# Patient Record
Sex: MALE | Race: BLACK OR AFRICAN AMERICAN | NOT HISPANIC OR LATINO | Employment: FULL TIME | URBAN - METROPOLITAN AREA
[De-identification: names, ages, dates, MRNs, and addresses within clinical notes are randomized per-mention and may not be internally consistent; named-entity substitution may affect disease eponyms.]

---

## 2024-09-12 ENCOUNTER — APPOINTMENT (OUTPATIENT)
Dept: PRIMARY CARE | Facility: CLINIC | Age: 64
End: 2024-09-12
Payer: COMMERCIAL

## 2024-09-12 ENCOUNTER — OFFICE VISIT (OUTPATIENT)
Dept: PRIMARY CARE | Facility: CLINIC | Age: 64
End: 2024-09-12
Payer: COMMERCIAL

## 2024-09-12 VITALS
SYSTOLIC BLOOD PRESSURE: 159 MMHG | HEIGHT: 72 IN | BODY MASS INDEX: 27.22 KG/M2 | DIASTOLIC BLOOD PRESSURE: 99 MMHG | WEIGHT: 201 LBS | HEART RATE: 75 BPM

## 2024-09-12 DIAGNOSIS — Z00.00 ROUTINE GENERAL MEDICAL EXAMINATION AT A HEALTH CARE FACILITY: ICD-10-CM

## 2024-09-12 DIAGNOSIS — Z12.5 PROSTATE CANCER SCREENING: ICD-10-CM

## 2024-09-12 DIAGNOSIS — Z12.11 COLON CANCER SCREENING: Primary | ICD-10-CM

## 2024-09-12 DIAGNOSIS — Z86.19 H/O HEPATITIS: ICD-10-CM

## 2024-09-12 PROCEDURE — 3008F BODY MASS INDEX DOCD: CPT | Performed by: FAMILY MEDICINE

## 2024-09-12 PROCEDURE — 99386 PREV VISIT NEW AGE 40-64: CPT | Performed by: FAMILY MEDICINE

## 2024-09-12 ASSESSMENT — ENCOUNTER SYMPTOMS
DEPRESSION: 0
OCCASIONAL FEELINGS OF UNSTEADINESS: 0
LOSS OF SENSATION IN FEET: 0

## 2024-09-12 ASSESSMENT — PATIENT HEALTH QUESTIONNAIRE - PHQ9
2. FEELING DOWN, DEPRESSED OR HOPELESS: NOT AT ALL
1. LITTLE INTEREST OR PLEASURE IN DOING THINGS: NOT AT ALL
SUM OF ALL RESPONSES TO PHQ9 QUESTIONS 1 AND 2: 0

## 2024-09-12 NOTE — PROGRESS NOTES
Subjective   Patient ID: Yang Carvajal is a 64 y.o. male who presents for No chief complaint on file..    Last Physical : ___1_ Years ago     Pt's PMH, PSH, SH, FH , meds and allergies was obtained / reviewed and updated .     Dental  Visits : Y  Vision issues : N  Hearing issues : N    Immunizations : Y    Diet :  Could be better   Exercise:  Not regularly  Weight concerns : None    Alcohol: as noted in SH  Tobacco: as noted in SH  Recreational drugs :  None /as noted in SH     Sexually active : Active   Contraception :   Erectile dysfunction :    Colorectal cancer screening   Prostate cancer screening     Metabolic screening   - Lipids   - Glucose   New patient  H/o Hep C treated   Needs forms complteed for school  Refused BP medication. Aware that BP is high denies CP SOB    ==================================    Visit Vitals  BP (!) 159/99   Pulse 75   Ht 1.829 m (6')   Wt 91.2 kg (201 lb)   BMI 27.26 kg/m²   Smoking Status Never   BSA 2.15 m²      =====================  Review of Systems:    Constitutional: no chills, no fever and no night sweats.     Eyes: no blurred vision and no eyesight problems.     ENT: no hearing loss, no nasal congestion, no nasal discharge, no hoarseness and no sore throat.     Cardiovascular: no chest pain, no intermittent leg claudication, no lower extremity edema, no palpitations and no syncope.     Respiratory: no cough, no shortness of breath during exertion, no shortness of breath at rest and no wheezing.     Gastrointestinal: no abdominal pain, no constipation, no blood in stools, no diarrhea, no melena, no nausea, no rectal pain and no vomiting.     Genitourinary: no dysuria, no change in urinary frequency, no urinary hesitancy and no feelings of urinary urgency.     Musculoskeletal: no arthralgias, no back pain and no myalgias.     Integumentary: no new skin lesions and no rashes.     Neurological: no difficulty walking, no headache, no limb weakness, no numbness and no  tingling.     Psychiatric: no anxiety, no depression, no anhedonia and no substance use disorders.     Endocrine: no recent weight gain and no recent weight loss.     Hematologic/Lymphatic: no tendency for easy bruising and no swollen glands.          All other systems have been reviewed and are negative for complaint.    =====================================================    Physical exam :    Constitutional: Alert and in no acute distress. Well developed, well nourished.     Eyes: Normal external exam. Pupils were equal in size, round, reactive to light (PERRL) with normal accommodation and extraocular movements intact (EOMI).     Ears, Nose, Mouth, and Throat: External inspection of ears and nose: Normal.  Otoscopic examination: Normal.      Neck: No neck mass was observed. Supple.     Cardiovascular: Heart rate and rhythm were normal, normal S1 and S2, no gallops, no murmurs and no pericardial rub    Pulmonary: No respiratory distress. Clear bilateral breath sounds.     Abdomen: Soft nontender; no abdominal mass palpated. No organomegaly.     Musculoskeletal: No joint swelling seen, normal movements of all extremities. Range of motion: Normal.  Muscle strength/tone: Normal.      Skin: Normal skin color and pigmentation, normal skin turgor, and no rash.     Neurologic: Deep tendon reflexes were 2+ and symmetric. Sensation: Normal.     Psychiatric: Judgment and insight: Intact. Mood and affect: Normal.    Lymphatic : Cervical/ axillary/ groin Lns Palpable/ non palpable       Assessment/Plan    Problem List Items Addressed This Visit             ICD-10-CM    Routine general medical examination at a health care facility Z00.00    Relevant Orders    CBC    Comprehensive Metabolic Panel    Lipid Panel    TSH with reflex to Free T4 if abnormal    Hemoglobin A1c    Hepatitis C RNA, Quantitative, PCR    HIV 1/2 Antigen/Antibody Screen with Reflex to Confirmation    Prostate cancer screening Z12.5    Relevant Orders     Prostate Specific Antigen, Screen    Colon cancer screening - Primary Z12.11    Relevant Orders    Cologuard® colon cancer screening    H/O hepatitis Z86.19    Relevant Orders    Hepatitis C RNA, Quantitative, PCR    HIV 1/2 Antigen/Antibody Screen with Reflex to Confirmation

## 2024-09-15 PROBLEM — Z12.11 COLON CANCER SCREENING: Status: ACTIVE | Noted: 2024-09-15

## 2024-09-15 PROBLEM — Z00.00 ROUTINE GENERAL MEDICAL EXAMINATION AT A HEALTH CARE FACILITY: Status: ACTIVE | Noted: 2024-09-15

## 2024-09-15 PROBLEM — Z86.19 H/O HEPATITIS: Status: ACTIVE | Noted: 2024-09-15

## 2024-09-15 PROBLEM — Z12.5 PROSTATE CANCER SCREENING: Status: ACTIVE | Noted: 2024-09-15

## 2024-09-18 ENCOUNTER — LAB (OUTPATIENT)
Dept: LAB | Facility: LAB | Age: 64
End: 2024-09-18
Payer: COMMERCIAL

## 2024-09-18 DIAGNOSIS — Z00.00 ROUTINE GENERAL MEDICAL EXAMINATION AT A HEALTH CARE FACILITY: ICD-10-CM

## 2024-09-18 DIAGNOSIS — Z86.19 H/O HEPATITIS: ICD-10-CM

## 2024-09-18 DIAGNOSIS — Z12.5 PROSTATE CANCER SCREENING: ICD-10-CM

## 2024-09-18 LAB
ALBUMIN SERPL BCP-MCNC: 4.6 G/DL (ref 3.4–5)
ALP SERPL-CCNC: 62 U/L (ref 33–136)
ALT SERPL W P-5'-P-CCNC: 16 U/L (ref 10–52)
ANION GAP SERPL CALC-SCNC: 13 MMOL/L (ref 10–20)
AST SERPL W P-5'-P-CCNC: 18 U/L (ref 9–39)
BILIRUB SERPL-MCNC: 0.6 MG/DL (ref 0–1.2)
BUN SERPL-MCNC: 18 MG/DL (ref 6–23)
CALCIUM SERPL-MCNC: 9.8 MG/DL (ref 8.6–10.6)
CHLORIDE SERPL-SCNC: 103 MMOL/L (ref 98–107)
CHOLEST SERPL-MCNC: 166 MG/DL (ref 0–199)
CHOLESTEROL/HDL RATIO: 4.3
CO2 SERPL-SCNC: 27 MMOL/L (ref 21–32)
CREAT SERPL-MCNC: 1.25 MG/DL (ref 0.5–1.3)
EGFRCR SERPLBLD CKD-EPI 2021: 64 ML/MIN/1.73M*2
ERYTHROCYTE [DISTWIDTH] IN BLOOD BY AUTOMATED COUNT: 11.6 % (ref 11.5–14.5)
GLUCOSE SERPL-MCNC: 87 MG/DL (ref 74–99)
HCT VFR BLD AUTO: 38.6 % (ref 41–52)
HDLC SERPL-MCNC: 38.9 MG/DL
HGB BLD-MCNC: 12.9 G/DL (ref 13.5–17.5)
HIV 1+2 AB+HIV1 P24 AG SERPL QL IA: NONREACTIVE
LDLC SERPL CALC-MCNC: 110 MG/DL
MCH RBC QN AUTO: 29.1 PG (ref 26–34)
MCHC RBC AUTO-ENTMCNC: 33.4 G/DL (ref 32–36)
MCV RBC AUTO: 87 FL (ref 80–100)
NON HDL CHOLESTEROL: 127 MG/DL (ref 0–149)
NRBC BLD-RTO: 0 /100 WBCS (ref 0–0)
PLATELET # BLD AUTO: 220 X10*3/UL (ref 150–450)
POTASSIUM SERPL-SCNC: 4.4 MMOL/L (ref 3.5–5.3)
PROT SERPL-MCNC: 8.6 G/DL (ref 6.4–8.2)
PSA SERPL-MCNC: 2.84 NG/ML
RBC # BLD AUTO: 4.43 X10*6/UL (ref 4.5–5.9)
SODIUM SERPL-SCNC: 139 MMOL/L (ref 136–145)
TRIGL SERPL-MCNC: 87 MG/DL (ref 0–149)
TSH SERPL-ACNC: 0.86 MIU/L (ref 0.44–3.98)
VLDL: 17 MG/DL (ref 0–40)
WBC # BLD AUTO: 5.1 X10*3/UL (ref 4.4–11.3)

## 2024-09-18 PROCEDURE — 83550 IRON BINDING TEST: CPT

## 2024-09-18 PROCEDURE — 83540 ASSAY OF IRON: CPT

## 2024-09-18 PROCEDURE — 85027 COMPLETE CBC AUTOMATED: CPT

## 2024-09-18 PROCEDURE — 83036 HEMOGLOBIN GLYCOSYLATED A1C: CPT

## 2024-09-18 PROCEDURE — 84153 ASSAY OF PSA TOTAL: CPT

## 2024-09-18 PROCEDURE — 87389 HIV-1 AG W/HIV-1&-2 AB AG IA: CPT

## 2024-09-18 PROCEDURE — 84443 ASSAY THYROID STIM HORMONE: CPT

## 2024-09-18 PROCEDURE — 82607 VITAMIN B-12: CPT

## 2024-09-18 PROCEDURE — 36415 COLL VENOUS BLD VENIPUNCTURE: CPT

## 2024-09-18 PROCEDURE — 87522 HEPATITIS C REVRS TRNSCRPJ: CPT

## 2024-09-18 PROCEDURE — 80053 COMPREHEN METABOLIC PANEL: CPT

## 2024-09-18 PROCEDURE — 80061 LIPID PANEL: CPT

## 2024-09-19 LAB
EST. AVERAGE GLUCOSE BLD GHB EST-MCNC: 94 MG/DL
HBA1C MFR BLD: 4.9 %

## 2024-09-20 LAB
HCV RNA SERPL NAA+PROBE-ACNC: NOT DETECTED K[IU]/ML
HCV RNA SERPL NAA+PROBE-LOG IU: NORMAL {LOG_IU}/ML

## 2024-09-23 DIAGNOSIS — R71.8 MICROCYTOSIS: Primary | ICD-10-CM

## 2024-09-23 LAB
IRON SATN MFR SERPL: 37 % (ref 25–45)
IRON SERPL-MCNC: 135 UG/DL (ref 35–150)
TIBC SERPL-MCNC: 366 UG/DL (ref 240–445)
UIBC SERPL-MCNC: 231 UG/DL (ref 110–370)
VIT B12 SERPL-MCNC: 378 PG/ML (ref 211–911)

## 2024-10-05 LAB — NONINV COLON CA DNA+OCC BLD SCRN STL QL: NEGATIVE

## 2024-10-18 ENCOUNTER — LAB (OUTPATIENT)
Dept: LAB | Facility: LAB | Age: 64
End: 2024-10-18
Payer: COMMERCIAL

## 2024-10-18 DIAGNOSIS — R71.8 MICROCYTOSIS: ICD-10-CM

## 2024-10-18 LAB
ERYTHROCYTE [DISTWIDTH] IN BLOOD BY AUTOMATED COUNT: 11.8 % (ref 11.5–14.5)
HCT VFR BLD AUTO: 39.4 % (ref 41–52)
HGB BLD-MCNC: 12.7 G/DL (ref 13.5–17.5)
MCH RBC QN AUTO: 28.6 PG (ref 26–34)
MCHC RBC AUTO-ENTMCNC: 32.2 G/DL (ref 32–36)
MCV RBC AUTO: 89 FL (ref 80–100)
NRBC BLD-RTO: 0 /100 WBCS (ref 0–0)
PLATELET # BLD AUTO: 216 X10*3/UL (ref 150–450)
RBC # BLD AUTO: 4.44 X10*6/UL (ref 4.5–5.9)
WBC # BLD AUTO: 6.5 X10*3/UL (ref 4.4–11.3)

## 2024-10-18 PROCEDURE — 36415 COLL VENOUS BLD VENIPUNCTURE: CPT

## 2024-10-18 PROCEDURE — 85027 COMPLETE CBC AUTOMATED: CPT

## 2024-11-01 PROBLEM — J06.9 URI (UPPER RESPIRATORY INFECTION): Status: ACTIVE | Noted: 2024-11-01

## 2024-11-01 PROBLEM — R86.8 ASTHENOSPERMIA: Status: ACTIVE | Noted: 2024-11-01

## 2024-11-01 PROBLEM — R03.0 ELEVATED BLOOD PRESSURE READING: Status: RESOLVED | Noted: 2024-11-01 | Resolved: 2024-11-01

## 2024-11-01 PROBLEM — N46.9 MALE INFERTILITY: Status: ACTIVE | Noted: 2024-11-01

## 2024-11-01 PROBLEM — N40.1 BPH WITH OBSTRUCTION/LOWER URINARY TRACT SYMPTOMS: Status: ACTIVE | Noted: 2024-11-01

## 2024-11-01 PROBLEM — N13.8 BPH WITH OBSTRUCTION/LOWER URINARY TRACT SYMPTOMS: Status: ACTIVE | Noted: 2024-11-01

## 2024-11-01 RX ORDER — CLOMIPHENE CITRATE 50 MG/1
TABLET ORAL
COMMUNITY
Start: 2019-10-03

## 2024-11-01 RX ORDER — TADALAFIL 5 MG/1
1 TABLET ORAL DAILY
COMMUNITY
Start: 2018-04-24

## 2024-11-01 RX ORDER — HYDROCORTISONE 25 MG/G
CREAM TOPICAL
COMMUNITY

## 2024-11-01 RX ORDER — GUAIFENESIN 600 MG/1
600 TABLET, EXTENDED RELEASE ORAL 2 TIMES DAILY
COMMUNITY

## 2024-11-01 RX ORDER — FLUTICASONE PROPIONATE 50 MCG
SPRAY, SUSPENSION (ML) NASAL
COMMUNITY

## 2024-11-05 ENCOUNTER — APPOINTMENT (OUTPATIENT)
Dept: PRIMARY CARE | Facility: CLINIC | Age: 64
End: 2024-11-05
Payer: COMMERCIAL

## 2024-11-07 ENCOUNTER — APPOINTMENT (OUTPATIENT)
Dept: PRIMARY CARE | Facility: CLINIC | Age: 64
End: 2024-11-07
Payer: COMMERCIAL

## 2024-11-07 VITALS
DIASTOLIC BLOOD PRESSURE: 91 MMHG | HEIGHT: 72 IN | BODY MASS INDEX: 27.22 KG/M2 | WEIGHT: 201 LBS | HEART RATE: 69 BPM | SYSTOLIC BLOOD PRESSURE: 176 MMHG

## 2024-11-07 DIAGNOSIS — B19.20 HEPATITIS C VIRUS INFECTION WITHOUT HEPATIC COMA, UNSPECIFIED CHRONICITY: ICD-10-CM

## 2024-11-07 DIAGNOSIS — Z86.19 H/O HEPATITIS: ICD-10-CM

## 2024-11-07 DIAGNOSIS — R71.8 MICROCYTOSIS: ICD-10-CM

## 2024-11-07 DIAGNOSIS — I10 PRIMARY HYPERTENSION: Primary | ICD-10-CM

## 2024-11-07 PROCEDURE — 99214 OFFICE O/P EST MOD 30 MIN: CPT | Performed by: FAMILY MEDICINE

## 2024-11-07 PROCEDURE — 3077F SYST BP >= 140 MM HG: CPT | Performed by: FAMILY MEDICINE

## 2024-11-07 PROCEDURE — 3008F BODY MASS INDEX DOCD: CPT | Performed by: FAMILY MEDICINE

## 2024-11-07 PROCEDURE — 3080F DIAST BP >= 90 MM HG: CPT | Performed by: FAMILY MEDICINE

## 2024-11-07 RX ORDER — AMLODIPINE BESYLATE 5 MG/1
5 TABLET ORAL DAILY
Qty: 30 TABLET | Refills: 1 | Status: SHIPPED | OUTPATIENT
Start: 2024-11-07 | End: 2025-05-06

## 2024-11-07 ASSESSMENT — ENCOUNTER SYMPTOMS
OCCASIONAL FEELINGS OF UNSTEADINESS: 0
LOSS OF SENSATION IN FEET: 0
DEPRESSION: 0

## 2024-11-07 NOTE — PROGRESS NOTES
Subjective   Patient ID: Yang Carvajal is a 64 y.o. male who presents for Follow-up.      HPI  Patient continues to have high blood pressure has refused treatment in the past blood pressure today 170/90 denies chest pain shortness of breath headache dizziness was also anemic at the last labs anemia has been persistent   Recent Cologuard was negative history of hep C recent hep C was negative  Current Outpatient Medications on File Prior to Visit   Medication Sig Dispense Refill    clomiPHENE (Clomid) 50 mg tablet Take by mouth once daily.      fluticasone (Flonase) 50 mcg/actuation nasal spray SHAKE LIQUID AND USE 1 SPRAY IN EACH NOSTRIL DAILY      guaiFENesin (Mucinex) 600 mg 12 hr tablet Take 1 tablet (600 mg) by mouth 2 times a day.      hydrocortisone 2.5 % cream Hydrocortisone 2.5 % External Cream   Refills: 0       Active      tadalafil (Cialis) 5 mg tablet Take 1 tablet (5 mg) by mouth once daily.       No current facility-administered medications on file prior to visit.        Review of Systems   Constitutional:  Negative for chills and fever.   HENT: Negative.     Respiratory: Negative.     Cardiovascular: Negative.    Gastrointestinal: Negative.  Negative for nausea and vomiting.   Endocrine: Negative.    Genitourinary: Negative.    Musculoskeletal: Negative.    Skin: Negative.  Negative for rash.   Allergic/Immunologic: Negative.    Neurological: Negative.    Hematological: Negative.    Psychiatric/Behavioral: Negative.     All other systems reviewed and are negative.      Objective   BP (!) 176/91   Pulse 69   Ht 1.829 m (6')   Wt 91.2 kg (201 lb)   BMI 27.26 kg/m²   BSA: 2.15 meters squared  Growth percentiles: Facility age limit for growth %tino is 20 years. Facility age limit for growth %tino is 20 years.   No visits with results within 1 Week(s) from this visit.   Latest known visit with results is:   Lab on 10/18/2024   Component Date Value Ref Range Status    WBC 10/18/2024 6.5  4.4 - 11.3  x10*3/uL Final    nRBC 10/18/2024 0.0  0.0 - 0.0 /100 WBCs Final    RBC 10/18/2024 4.44 (L)  4.50 - 5.90 x10*6/uL Final    Hemoglobin 10/18/2024 12.7 (L)  13.5 - 17.5 g/dL Final    Hematocrit 10/18/2024 39.4 (L)  41.0 - 52.0 % Final    MCV 10/18/2024 89  80 - 100 fL Final    MCH 10/18/2024 28.6  26.0 - 34.0 pg Final    MCHC 10/18/2024 32.2  32.0 - 36.0 g/dL Final    RDW 10/18/2024 11.8  11.5 - 14.5 % Final    Platelets 10/18/2024 216  150 - 450 x10*3/uL Final      Physical Exam  Constitutional:       Appearance: Normal appearance.   Cardiovascular:      Rate and Rhythm: Normal rate and regular rhythm.   Pulmonary:      Effort: Pulmonary effort is normal.      Breath sounds: Normal breath sounds.   Abdominal:      General: Bowel sounds are normal.   Neurological:      General: No focal deficit present.      Mental Status: He is alert.   Psychiatric:         Mood and Affect: Mood normal.         Assessment/Plan   Problem List Items Addressed This Visit             ICD-10-CM    H/O hepatitis Z86.19     Recent hepatitis C is negative         RESOLVED: Hepatitis C virus infection B19.20     Hepatitis C was negative         Microcytosis R71.8     Longstanding history of anemia previous records were reviewed going back to 2021 which showed similar hemoglobin and hematocrit we will recheck in a month         Relevant Orders    CBC    Primary hypertension - Primary I10     What is High Blood Pressure? High blood pressure (also called hypertension) is when your blood moves through your arteries at a higher pressure than normal and that forces your heart to work harder to pump the blood.     What are the Complications of High Blood Pressure? When your blood pressure gets too high or stays high for a long time, it can cause health problems such as:    Kidney disease or kidney failure   Heart attack and heart failure   Stroke   Vision problems   Circulation problems, poor blood supply to the legs    How are the causes of High  Blood Pressure? There are many different causes and your provider can help you find out what might be causing your pressure to be too high.     What are the Signs of High Blood Pressure? High blood pressure doesn’t usually have warning signs or symptoms, so many people don’t realize they have it and that is why regular monitoring is important.     Prevention and Treatment: Often high blood pressure can be controlled with lifestyle changes and when necessary, medications. Adopting heart healthy habits can help:      Maintain a Healthy Weight   Eat a heart healthy diet full of vegetables, fruits and whole grains that are high in fiber   Be Physically Active every day   Find heart healthy ways to reduce stress and increase relaxation    Don’t use tobacco products   Limit your intake of alcohol, caffeine and salt   Monitor your blood pressure regularly: ask your provider how often   Take your medications as prescribed, even when you’re feeling well   Patient was advised that it is very important that he start medication will start amlodipine 5 mg daily patient will continue to monitor blood pressure at home and call this office back he was advised to stop in 2 weeks for nurse visit to get blood pressure checked follow-up with me in a month to check blood pressure         Relevant Medications    amLODIPine (Norvasc) 5 mg tablet

## 2024-11-10 PROBLEM — I10 PRIMARY HYPERTENSION: Status: ACTIVE | Noted: 2024-11-10

## 2024-11-10 PROBLEM — R71.8 MICROCYTOSIS: Status: ACTIVE | Noted: 2024-11-10

## 2024-11-10 ASSESSMENT — ENCOUNTER SYMPTOMS
ENDOCRINE NEGATIVE: 1
ALLERGIC/IMMUNOLOGIC NEGATIVE: 1
VOMITING: 0
PSYCHIATRIC NEGATIVE: 1
GASTROINTESTINAL NEGATIVE: 1
MUSCULOSKELETAL NEGATIVE: 1
CARDIOVASCULAR NEGATIVE: 1
FEVER: 0
CHILLS: 0
NAUSEA: 0
RESPIRATORY NEGATIVE: 1
NEUROLOGICAL NEGATIVE: 1
HEMATOLOGIC/LYMPHATIC NEGATIVE: 1

## 2024-11-10 NOTE — ASSESSMENT & PLAN NOTE
Longstanding history of anemia previous records were reviewed going back to 2021 which showed similar hemoglobin and hematocrit we will recheck in a month

## 2024-11-10 NOTE — ASSESSMENT & PLAN NOTE
What is High Blood Pressure? High blood pressure (also called hypertension) is when your blood moves through your arteries at a higher pressure than normal and that forces your heart to work harder to pump the blood.     What are the Complications of High Blood Pressure? When your blood pressure gets too high or stays high for a long time, it can cause health problems such as:    Kidney disease or kidney failure   Heart attack and heart failure   Stroke   Vision problems   Circulation problems, poor blood supply to the legs    How are the causes of High Blood Pressure? There are many different causes and your provider can help you find out what might be causing your pressure to be too high.     What are the Signs of High Blood Pressure? High blood pressure doesn’t usually have warning signs or symptoms, so many people don’t realize they have it and that is why regular monitoring is important.     Prevention and Treatment: Often high blood pressure can be controlled with lifestyle changes and when necessary, medications. Adopting heart healthy habits can help:      Maintain a Healthy Weight   Eat a heart healthy diet full of vegetables, fruits and whole grains that are high in fiber   Be Physically Active every day   Find heart healthy ways to reduce stress and increase relaxation    Don’t use tobacco products   Limit your intake of alcohol, caffeine and salt   Monitor your blood pressure regularly: ask your provider how often   Take your medications as prescribed, even when you’re feeling well   Patient was advised that it is very important that he start medication will start amlodipine 5 mg daily patient will continue to monitor blood pressure at home and call this office back he was advised to stop in 2 weeks for nurse visit to get blood pressure checked follow-up with me in a month to check blood pressure

## 2024-12-23 ENCOUNTER — APPOINTMENT (OUTPATIENT)
Dept: PRIMARY CARE | Facility: CLINIC | Age: 64
End: 2024-12-23
Payer: COMMERCIAL

## 2024-12-23 VITALS
SYSTOLIC BLOOD PRESSURE: 146 MMHG | WEIGHT: 202 LBS | BODY MASS INDEX: 27.36 KG/M2 | HEIGHT: 72 IN | HEART RATE: 73 BPM | DIASTOLIC BLOOD PRESSURE: 89 MMHG

## 2024-12-23 DIAGNOSIS — M54.12 LEFT CERVICAL RADICULOPATHY: Primary | ICD-10-CM

## 2024-12-23 DIAGNOSIS — N13.8 BPH WITH OBSTRUCTION/LOWER URINARY TRACT SYMPTOMS: ICD-10-CM

## 2024-12-23 DIAGNOSIS — N40.1 BPH WITH OBSTRUCTION/LOWER URINARY TRACT SYMPTOMS: ICD-10-CM

## 2024-12-23 DIAGNOSIS — R71.8 MICROCYTOSIS: ICD-10-CM

## 2024-12-23 DIAGNOSIS — I10 PRIMARY HYPERTENSION: ICD-10-CM

## 2024-12-23 PROCEDURE — 3077F SYST BP >= 140 MM HG: CPT | Performed by: FAMILY MEDICINE

## 2024-12-23 PROCEDURE — 99214 OFFICE O/P EST MOD 30 MIN: CPT | Performed by: FAMILY MEDICINE

## 2024-12-23 PROCEDURE — 3079F DIAST BP 80-89 MM HG: CPT | Performed by: FAMILY MEDICINE

## 2024-12-23 PROCEDURE — 3008F BODY MASS INDEX DOCD: CPT | Performed by: FAMILY MEDICINE

## 2024-12-23 RX ORDER — AMLODIPINE BESYLATE 5 MG/1
5 TABLET ORAL DAILY
Qty: 90 TABLET | Refills: 1 | Status: SHIPPED | OUTPATIENT
Start: 2024-12-23 | End: 2025-06-21

## 2024-12-23 ASSESSMENT — ENCOUNTER SYMPTOMS
DEPRESSION: 0
LOSS OF SENSATION IN FEET: 0
OCCASIONAL FEELINGS OF UNSTEADINESS: 0

## 2024-12-23 ASSESSMENT — PATIENT HEALTH QUESTIONNAIRE - PHQ9
1. LITTLE INTEREST OR PLEASURE IN DOING THINGS: NOT AT ALL
SUM OF ALL RESPONSES TO PHQ9 QUESTIONS 1 AND 2: 0
2. FEELING DOWN, DEPRESSED OR HOPELESS: NOT AT ALL

## 2024-12-23 NOTE — PROGRESS NOTES
Subjective   Patient ID: Yang Carvajal is a 64 y.o. male who presents for Follow-up.      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  Taking iron  Current Outpatient Medications on File Prior to Visit   Medication Sig Dispense Refill    clomiPHENE (Clomid) 50 mg tablet Take by mouth once daily.      fluticasone (Flonase) 50 mcg/actuation nasal spray SHAKE LIQUID AND USE 1 SPRAY IN EACH NOSTRIL DAILY      guaiFENesin (Mucinex) 600 mg 12 hr tablet Take 1 tablet (600 mg) by mouth 2 times a day.      hydrocortisone 2.5 % cream Hydrocortisone 2.5 % External Cream   Refills: 0       Active      tadalafil (Cialis) 5 mg tablet Take 1 tablet (5 mg) by mouth once daily.      [DISCONTINUED] amLODIPine (Norvasc) 5 mg tablet Take 1 tablet (5 mg) by mouth once daily. 30 tablet 1     No current facility-administered medications on file prior to visit.        Review of Systems   Constitutional:  Negative for chills and fever.   HENT: Negative.     Respiratory: Negative.     Cardiovascular: Negative.    Gastrointestinal: Negative.  Negative for nausea and vomiting.   Endocrine: Negative.    Genitourinary: Negative.    Musculoskeletal: Negative.    Skin: Negative.  Negative for rash.   Allergic/Immunologic: Negative.    Neurological:  Positive for numbness.   Hematological: Negative.    Psychiatric/Behavioral: Negative.     All other systems reviewed and are negative.      Objective   /89   Pulse 73   Ht 1.829 m (6')   Wt 91.6 kg (202 lb)   BMI 27.40 kg/m²   BSA: 2.16 meters squared  Growth percentiles: Facility age limit for growth %tino is 20 years. Facility age limit for growth %tino is 20 years.   No visits with results within 1 Week(s) from this visit.   Latest known visit with results is:   Lab on 10/18/2024   Component Date Value Ref Range Status    WBC 10/18/2024  6.5  4.4 - 11.3 x10*3/uL Final    nRBC 10/18/2024 0.0  0.0 - 0.0 /100 WBCs Final    RBC 10/18/2024 4.44 (L)  4.50 - 5.90 x10*6/uL Final    Hemoglobin 10/18/2024 12.7 (L)  13.5 - 17.5 g/dL Final    Hematocrit 10/18/2024 39.4 (L)  41.0 - 52.0 % Final    MCV 10/18/2024 89  80 - 100 fL Final    MCH 10/18/2024 28.6  26.0 - 34.0 pg Final    MCHC 10/18/2024 32.2  32.0 - 36.0 g/dL Final    RDW 10/18/2024 11.8  11.5 - 14.5 % Final    Platelets 10/18/2024 216  150 - 450 x10*3/uL Final      Physical Exam  Constitutional:       General: He is not in acute distress.  Eyes:      Extraocular Movements: Extraocular movements intact.   Cardiovascular:      Rate and Rhythm: Normal rate and regular rhythm.   Pulmonary:      Breath sounds: Normal breath sounds.   Abdominal:      General: Bowel sounds are normal.   Musculoskeletal:         General: Normal range of motion.      Cervical back: No rigidity.   Skin:     Findings: No rash.   Neurological:      General: No focal deficit present.      Mental Status: He is alert.   Psychiatric:         Thought Content: Thought content normal.         Assessment/Plan   Problem List Items Addressed This Visit       BPH with obstruction/lower urinary tract symptoms    Microcytosis     Longstanding history of anemia previous records were reviewed going back to 2021 which showed similar hemoglobin and hematocrit we will recheck in a month         Primary hypertension     What is High Blood Pressure? High blood pressure (also called hypertension) is when your blood moves through your arteries at a higher pressure than normal and that forces your heart to work harder to pump the blood.     What are the Complications of High Blood Pressure? When your blood pressure gets too high or stays high for a long time, it can cause health problems such as:    Kidney disease or kidney failure   Heart attack and heart failure   Stroke   Vision problems   Circulation problems, poor blood supply to the legs    How  are the causes of High Blood Pressure? There are many different causes and your provider can help you find out what might be causing your pressure to be too high.     What are the Signs of High Blood Pressure? High blood pressure doesn’t usually have warning signs or symptoms, so many people don’t realize they have it and that is why regular monitoring is important.     Prevention and Treatment: Often high blood pressure can be controlled with lifestyle changes and when necessary, medications. Adopting heart healthy habits can help:      Maintain a Healthy Weight   Eat a heart healthy diet full of vegetables, fruits and whole grains that are high in fiber   Be Physically Active every day   Find heart healthy ways to reduce stress and increase relaxation    Don’t use tobacco products   Limit your intake of alcohol, caffeine and salt   Monitor your blood pressure regularly: ask your provider how often   Take your medications as prescribed, even when you’re feeling well   Patient was advised that it is very important that he start medication will start amlodipine 5 mg daily patient will continue to monitor blood pressure at home and call this office back he was advised to stop in 2 weeks for nurse visit to get blood pressure checked follow-up with me in a month to check blood pressure         Relevant Medications    amLODIPine (Norvasc) 5 mg tablet    Left cervical radiculopathy - Primary    Relevant Orders    XR cervical spine 2-3 views

## 2024-12-27 ENCOUNTER — HOSPITAL ENCOUNTER (OUTPATIENT)
Dept: RADIOLOGY | Facility: CLINIC | Age: 64
Discharge: HOME | End: 2024-12-27
Payer: COMMERCIAL

## 2024-12-27 DIAGNOSIS — M54.12 LEFT CERVICAL RADICULOPATHY: ICD-10-CM

## 2024-12-27 PROCEDURE — 72040 X-RAY EXAM NECK SPINE 2-3 VW: CPT

## 2024-12-29 PROBLEM — M54.12 LEFT CERVICAL RADICULOPATHY: Status: ACTIVE | Noted: 2024-12-29

## 2024-12-29 ASSESSMENT — ENCOUNTER SYMPTOMS
ALLERGIC/IMMUNOLOGIC NEGATIVE: 1
CARDIOVASCULAR NEGATIVE: 1
RESPIRATORY NEGATIVE: 1
HEMATOLOGIC/LYMPHATIC NEGATIVE: 1
PSYCHIATRIC NEGATIVE: 1
MUSCULOSKELETAL NEGATIVE: 1
FEVER: 0
CHILLS: 0
NUMBNESS: 1
NAUSEA: 0
ENDOCRINE NEGATIVE: 1
VOMITING: 0
GASTROINTESTINAL NEGATIVE: 1

## 2025-01-03 DIAGNOSIS — R20.0 NUMBNESS OF UPPER EXTREMITY: ICD-10-CM

## 2025-01-03 DIAGNOSIS — M54.12 LEFT CERVICAL RADICULOPATHY: ICD-10-CM

## 2025-01-03 DIAGNOSIS — M50.30 DDD (DEGENERATIVE DISC DISEASE), CERVICAL: Primary | ICD-10-CM

## 2025-01-29 ENCOUNTER — HOSPITAL ENCOUNTER (OUTPATIENT)
Dept: RADIOLOGY | Facility: CLINIC | Age: 65
Discharge: HOME | End: 2025-01-29
Payer: COMMERCIAL

## 2025-01-29 DIAGNOSIS — M50.30 DDD (DEGENERATIVE DISC DISEASE), CERVICAL: ICD-10-CM

## 2025-01-29 DIAGNOSIS — R20.0 NUMBNESS OF UPPER EXTREMITY: ICD-10-CM

## 2025-01-29 DIAGNOSIS — M54.12 LEFT CERVICAL RADICULOPATHY: ICD-10-CM

## 2025-01-29 PROCEDURE — 72141 MRI NECK SPINE W/O DYE: CPT

## 2025-01-29 PROCEDURE — 72141 MRI NECK SPINE W/O DYE: CPT | Performed by: RADIOLOGY

## 2025-01-31 DIAGNOSIS — D50.9 MICROCYTIC ANEMIA: Primary | ICD-10-CM

## 2025-03-25 ENCOUNTER — LAB (OUTPATIENT)
Dept: LAB | Facility: HOSPITAL | Age: 65
End: 2025-03-25
Payer: COMMERCIAL

## 2025-03-25 ENCOUNTER — OFFICE VISIT (OUTPATIENT)
Dept: HEMATOLOGY/ONCOLOGY | Facility: HOSPITAL | Age: 65
End: 2025-03-25
Payer: COMMERCIAL

## 2025-03-25 VITALS
TEMPERATURE: 97.2 F | BODY MASS INDEX: 28.94 KG/M2 | WEIGHT: 202.16 LBS | RESPIRATION RATE: 20 BRPM | DIASTOLIC BLOOD PRESSURE: 97 MMHG | OXYGEN SATURATION: 100 % | HEART RATE: 72 BPM | SYSTOLIC BLOOD PRESSURE: 158 MMHG | HEIGHT: 70 IN

## 2025-03-25 DIAGNOSIS — R71.8 MICROCYTOSIS: ICD-10-CM

## 2025-03-25 DIAGNOSIS — D64.9 ANEMIA, UNSPECIFIED TYPE: Primary | ICD-10-CM

## 2025-03-25 DIAGNOSIS — D64.9 ANEMIA, UNSPECIFIED TYPE: ICD-10-CM

## 2025-03-25 LAB
ALBUMIN SERPL BCP-MCNC: 4.8 G/DL (ref 3.4–5)
ALP SERPL-CCNC: 64 U/L (ref 33–136)
ALT SERPL W P-5'-P-CCNC: 18 U/L (ref 10–52)
ANION GAP SERPL CALC-SCNC: 11 MMOL/L (ref 10–20)
AST SERPL W P-5'-P-CCNC: 19 U/L (ref 9–39)
BASOPHILS # BLD AUTO: 0.05 X10*3/UL (ref 0–0.1)
BASOPHILS NFR BLD AUTO: 1 %
BILIRUB SERPL-MCNC: 0.3 MG/DL (ref 0–1.2)
BUN SERPL-MCNC: 19 MG/DL (ref 6–23)
CALCIUM SERPL-MCNC: 10 MG/DL (ref 8.6–10.3)
CHLORIDE SERPL-SCNC: 103 MMOL/L (ref 98–107)
CO2 SERPL-SCNC: 31 MMOL/L (ref 21–32)
CREAT SERPL-MCNC: 1.3 MG/DL (ref 0.5–1.3)
CRP SERPL-MCNC: 0.13 MG/DL
DAT-POLYSPECIFIC: NORMAL
EGFRCR SERPLBLD CKD-EPI 2021: 61 ML/MIN/1.73M*2
EOSINOPHIL # BLD AUTO: 0.27 X10*3/UL (ref 0–0.7)
EOSINOPHIL NFR BLD AUTO: 5.3 %
ERYTHROCYTE [DISTWIDTH] IN BLOOD BY AUTOMATED COUNT: 11.6 % (ref 11.5–14.5)
ERYTHROCYTE [SEDIMENTATION RATE] IN BLOOD BY WESTERGREN METHOD: 4 MM/H (ref 0–20)
FERRITIN SERPL-MCNC: 245 NG/ML (ref 20–300)
FOLATE SERPL-MCNC: 9.8 NG/ML
GLUCOSE SERPL-MCNC: 112 MG/DL (ref 74–99)
HAPTOGLOB SERPL NEPH-MCNC: 94 MG/DL (ref 30–200)
HCT VFR BLD AUTO: 40.6 % (ref 41–52)
HGB BLD-MCNC: 13.2 G/DL (ref 13.5–17.5)
HGB RETIC QN: 33 PG (ref 28–38)
IGA SERPL-MCNC: 180 MG/DL (ref 70–400)
IGG SERPL-MCNC: 2640 MG/DL (ref 700–1600)
IGM SERPL-MCNC: 99 MG/DL (ref 40–230)
IMM GRANULOCYTES # BLD AUTO: 0.01 X10*3/UL (ref 0–0.7)
IMM GRANULOCYTES NFR BLD AUTO: 0.2 % (ref 0–0.9)
IMMATURE RETIC FRACTION: 9.2 %
IRON SATN MFR SERPL: 18 % (ref 25–45)
IRON SERPL-MCNC: 72 UG/DL (ref 35–150)
KAPPA LC SERPL-MCNC: 4.57 MG/DL (ref 0.33–1.94)
KAPPA LC/LAMBDA SER: 1.62 {RATIO} (ref 0.26–1.65)
LAMBDA LC SERPL-MCNC: 2.82 MG/DL (ref 0.57–2.63)
LDH SERPL L TO P-CCNC: 144 U/L (ref 84–246)
LYMPHOCYTES # BLD AUTO: 1.78 X10*3/UL (ref 1.2–4.8)
LYMPHOCYTES NFR BLD AUTO: 35.2 %
MCH RBC QN AUTO: 28.9 PG (ref 26–34)
MCHC RBC AUTO-ENTMCNC: 32.5 G/DL (ref 32–36)
MCV RBC AUTO: 89 FL (ref 80–100)
MONOCYTES # BLD AUTO: 0.44 X10*3/UL (ref 0.1–1)
MONOCYTES NFR BLD AUTO: 8.7 %
NEUTROPHILS # BLD AUTO: 2.51 X10*3/UL (ref 1.2–7.7)
NEUTROPHILS NFR BLD AUTO: 49.6 %
NRBC BLD-RTO: 0 /100 WBCS (ref 0–0)
PLATELET # BLD AUTO: 206 X10*3/UL (ref 150–450)
POTASSIUM SERPL-SCNC: 4.2 MMOL/L (ref 3.5–5.3)
PROT SERPL-MCNC: 8.7 G/DL (ref 6.4–8.2)
PROT SERPL-MCNC: 8.9 G/DL (ref 6.4–8.2)
RBC # BLD AUTO: 4.56 X10*6/UL (ref 4.5–5.9)
RETICS #: 0.07 X10*6/UL (ref 0.02–0.12)
RETICS/RBC NFR AUTO: 1.6 % (ref 0.5–2)
RHEUMATOID FACT SER NEPH-ACNC: <10 IU/ML (ref 0–15)
SODIUM SERPL-SCNC: 141 MMOL/L (ref 136–145)
TIBC SERPL-MCNC: 390 UG/DL (ref 240–445)
TSH SERPL-ACNC: 1.01 MIU/L (ref 0.44–3.98)
UIBC SERPL-MCNC: 318 UG/DL (ref 110–370)
VIT B12 SERPL-MCNC: 304 PG/ML (ref 211–911)
WBC # BLD AUTO: 5.1 X10*3/UL (ref 4.4–11.3)

## 2025-03-25 PROCEDURE — 3077F SYST BP >= 140 MM HG: CPT | Performed by: PHYSICIAN ASSISTANT

## 2025-03-25 PROCEDURE — 83010 ASSAY OF HAPTOGLOBIN QUANT: CPT

## 2025-03-25 PROCEDURE — 86334 IMMUNOFIX E-PHORESIS SERUM: CPT

## 2025-03-25 PROCEDURE — 80053 COMPREHEN METABOLIC PANEL: CPT

## 2025-03-25 PROCEDURE — 86140 C-REACTIVE PROTEIN: CPT

## 2025-03-25 PROCEDURE — 86431 RHEUMATOID FACTOR QUANT: CPT

## 2025-03-25 PROCEDURE — 1160F RVW MEDS BY RX/DR IN RCRD: CPT | Performed by: PHYSICIAN ASSISTANT

## 2025-03-25 PROCEDURE — 86880 COOMBS TEST DIRECT: CPT

## 2025-03-25 PROCEDURE — 3008F BODY MASS INDEX DOCD: CPT | Performed by: PHYSICIAN ASSISTANT

## 2025-03-25 PROCEDURE — 36415 COLL VENOUS BLD VENIPUNCTURE: CPT

## 2025-03-25 PROCEDURE — 85025 COMPLETE CBC W/AUTO DIFF WBC: CPT

## 2025-03-25 PROCEDURE — 82746 ASSAY OF FOLIC ACID SERUM: CPT

## 2025-03-25 PROCEDURE — 82784 ASSAY IGA/IGD/IGG/IGM EACH: CPT

## 2025-03-25 PROCEDURE — 86038 ANTINUCLEAR ANTIBODIES: CPT

## 2025-03-25 PROCEDURE — 1126F AMNT PAIN NOTED NONE PRSNT: CPT | Performed by: PHYSICIAN ASSISTANT

## 2025-03-25 PROCEDURE — 82668 ASSAY OF ERYTHROPOIETIN: CPT

## 2025-03-25 PROCEDURE — 99204 OFFICE O/P NEW MOD 45 MIN: CPT | Performed by: PHYSICIAN ASSISTANT

## 2025-03-25 PROCEDURE — 85652 RBC SED RATE AUTOMATED: CPT

## 2025-03-25 PROCEDURE — 3080F DIAST BP >= 90 MM HG: CPT | Performed by: PHYSICIAN ASSISTANT

## 2025-03-25 PROCEDURE — 82607 VITAMIN B-12: CPT

## 2025-03-25 PROCEDURE — 82728 ASSAY OF FERRITIN: CPT

## 2025-03-25 PROCEDURE — 99214 OFFICE O/P EST MOD 30 MIN: CPT | Mod: 25 | Performed by: PHYSICIAN ASSISTANT

## 2025-03-25 PROCEDURE — 86225 DNA ANTIBODY NATIVE: CPT

## 2025-03-25 PROCEDURE — 83615 LACTATE (LD) (LDH) ENZYME: CPT

## 2025-03-25 PROCEDURE — 83521 IG LIGHT CHAINS FREE EACH: CPT

## 2025-03-25 PROCEDURE — 1159F MED LIST DOCD IN RCRD: CPT | Performed by: PHYSICIAN ASSISTANT

## 2025-03-25 PROCEDURE — 83540 ASSAY OF IRON: CPT

## 2025-03-25 PROCEDURE — 84443 ASSAY THYROID STIM HORMONE: CPT

## 2025-03-25 PROCEDURE — 84165 PROTEIN E-PHORESIS SERUM: CPT

## 2025-03-25 PROCEDURE — 84155 ASSAY OF PROTEIN SERUM: CPT | Mod: 59

## 2025-03-25 PROCEDURE — 85045 AUTOMATED RETICULOCYTE COUNT: CPT

## 2025-03-25 ASSESSMENT — PAIN SCALES - GENERAL: PAINLEVEL_OUTOF10: 0-NO PAIN

## 2025-03-25 NOTE — PROGRESS NOTES
Patient ID: Yang Carvajal is a 65 y.o. male.  Referring Physician: Domo Shields MD  [address] [facility] [address]  [place] [address]  Primary Care Provider: Domo Shields MD  Visit Type: Initial Visit    Location: [facility] - Main  Diagnosis/Reason: Anemia    HPI:  Yang Carvajal is a 65 y.o. male referred for consultation of anemia    Per review of records:  NC/NC anemia since at least 9/18/24 with Hb range of 12.7-12.9 since then  RBC counts & Hct's low since 9/18/24 - RDW's WNL historically    Previous Hematological Background:  Hx of hematological disorders: No - Patient denies prior hematologic history  Hx of blood transfusions: No - Patient denies prior blood transfusion  Hx of iron supplementation: Yes - Oral supplementation - Denies adverse effect and reaction - Agent: OTC iron  Hx of B12 supplementation: Yes - Prior oral supplementation - Denies adverse effect and reaction  Hx of folate supplementation: Yes - Prior oral supplementation - Denies adverse effect and reaction    Patient denies weight loss, abnormal bruising and bleeding, hematuria, blood in stool, dark/black stools, epistaxis, oral/gingival bleeding, lymphadenopathy, recurrent infections, recurrent fevers, night sweats, early satiety, abdominal pain, bone pain, chest pain, palpitations, SOB, MITCHELL, fatigue, dizziness, lightheadedness, PICA.    PMHx:  Active Ambulatory Problems     Diagnosis Date Noted    Routine general medical examination at a health care facility 09/15/2024    Prostate cancer screening 09/15/2024    Colon cancer screening 09/15/2024    H/O hepatitis 09/15/2024    Asthenospermia 11/01/2024    BPH with obstruction/lower urinary tract symptoms 11/01/2024    Male infertility 11/01/2024    Orchitis and epididymitis 07/08/2005    URI (upper respiratory infection) 11/01/2024    Microcytosis 11/10/2024    Primary hypertension 11/10/2024    Left cervical radiculopathy 12/29/2024     Resolved Ambulatory Problems     Diagnosis Date Noted     "Elevated blood pressure reading 11/01/2024    Hepatitis C virus infection 01/06/2011     Past Medical History:   Diagnosis Date    Hepatitis      PSHx:  Past Surgical History:   Procedure Laterality Date    APPENDECTOMY      OTHER SURGICAL HISTORY  11/13/2018    Appendectomy     FHx:  Family History   Problem Relation Name Age of Onset    Hypertension Mother       Siblings: 1 sister w/ iron deficiency  Children: 1 - Denies significant medical hx    Social Hx:  Yang Carvajal    reports that he has never smoked. He has never used smokeless tobacco.  He  reports current alcohol use.  He  reports no history of drug use.  Social History     Socioeconomic History    Marital status: Unknown   Tobacco Use    Smoking status: Never    Smokeless tobacco: Never   Substance and Sexual Activity    Alcohol use: Yes    Drug use: Never      Living Situation: Lives at home w/ family  Occupation: Teacher  Marital Status:   Alcohol Use: Denies  Smoking: Never smoker  Recreational Drug Use: Denies  Special Diets: Avoids red meats    Cancer Screenings:  Upper EGD: No record EMR  Colonoscopy: No record EMR   Prostate/PSA screenings: 9/18/24  Lung cancer screenings: No record EMR    Medications and allergies reviewed in EMR.    ROS:  Review of Systems - Oncology   10 point review of systems negative except as state in HPI.    Vitals & Statistics:  Objective   BSA: 2.13 meters squared  BP (!) 158/97   Pulse 72   Temp 36.2 °C (97.2 °F) (Core)   Resp 20   Ht (S) 1.786 m (5' 10.32\")   Wt 91.7 kg (202 lb 2.6 oz)   SpO2 100%   BMI 28.75 kg/m²     Physical Exam:  Physical Exam  Vitals and nursing note reviewed.   Constitutional:       Appearance: Normal appearance.   HENT:      Head: Normocephalic and atraumatic.      Right Ear: External ear normal.      Left Ear: External ear normal.      Nose: Nose normal.      Mouth/Throat:      Mouth: Mucous membranes are moist.      Pharynx: Oropharynx is clear.   Eyes:      General: Lids are " "normal.      Extraocular Movements: Extraocular movements intact.      Conjunctiva/sclera: Conjunctivae normal.      Pupils: Pupils are equal, round, and reactive to light.      Comments: Wearing corrective lenses   Cardiovascular:      Rate and Rhythm: Normal rate and regular rhythm.      Pulses: Normal pulses.      Heart sounds: Normal heart sounds.   Pulmonary:      Effort: Pulmonary effort is normal.      Breath sounds: Normal breath sounds.   Abdominal:      General: Abdomen is flat. Bowel sounds are normal.      Palpations: Abdomen is soft.      Comments: No masses or organomegaly upon physical exam   Musculoskeletal:         General: Normal range of motion.      Cervical back: Normal range of motion.   Lymphadenopathy:      Comments: No lymphadenopathy palpable on physical exam   Skin:     General: Skin is warm and dry.      Capillary Refill: Capillary refill takes less than 2 seconds.   Neurological:      General: No focal deficit present.      Mental Status: He is alert and oriented to person, place, and time.   Psychiatric:         Mood and Affect: Mood normal.         Behavior: Behavior normal.         Thought Content: Thought content normal.         Judgment: Judgment normal.           Results:  Lab Results   Component Value Date    WBC 6.5 10/18/2024    RBC 4.44 (L) 10/18/2024    MCV 89 10/18/2024    MCHC 32.2 10/18/2024    HGB 12.7 (L) 10/18/2024    HCT 39.4 (L) 10/18/2024     10/18/2024     No results found for: \"RETICCTPCT\"   Lab Results   Component Value Date    CREATININE 1.25 09/18/2024    BUN 18 09/18/2024    EGFR 64 09/18/2024     09/18/2024    K 4.4 09/18/2024     09/18/2024    CO2 27 09/18/2024      Lab Results   Component Value Date    ALT 16 09/18/2024    AST 18 09/18/2024    ALKPHOS 62 09/18/2024    BILITOT 0.6 09/18/2024      Lab Results   Component Value Date    TSH 0.86 09/18/2024     Lab Results   Component Value Date    TSH 0.86 09/18/2024     Lab Results   Component " "Value Date    IRON 135 09/18/2024    TIBC 366 09/18/2024      Lab Results   Component Value Date    TJHGDGTG86 378 09/18/2024      No results found for: \"FOLATE\"  No results found for: \"JAMES\", \"RF\", \"SEDRATE\"   No results found for: \"CRP\"   No results found for: \"KERA\"  No results found for: \"LDH\"  No results found for: \"HAPTOGLOBIN\"  No results found for: \"SPEP\"  No results found for: \"IGG\", \"IGM\", \"IGA\"  No results found for: \"HEPATOT\", \"HEPAIGM\", \"HEPBCIGM\", \"HEPBCAB\", \"HEPBSAG\", \"HEPCAB\"  Lab Results   Component Value Date    HIV1X2 Nonreactive 09/18/2024       Assessment:  Yang Carvajal is a 65 y.o. male referred for consultation of anemia    I reviewed patient's chart including but not limited to labs, imaging, surgical/procedure notes, pathology, hospital notes, doctor's notes.    3/25/25 results:  WBC count & differential WNL  NC/NC anemia w/ Hb improved to 13.2 - RBC count WNL, Hct low, RDW WNL  Platelets WNL  Remaining results pending at time of writing    Plan:  Discussed possible etiologies including multifactorial, nutritional deficiencies, anemia of chronic disease, malabsorption, hemopathy, medications, bleeding, malignancy, etc. Will start hematological workup today.    Anemia  Lab results pending - Will review when available and address adverse results as needed  RTC in 2-4 weeks via virtual/telehealth visit - F/U sooner if needed/urgent    I had an extensive discussion with the patient regarding the diagnosis and discussed the plan of therapy, including general considerations regarding side effects and outcomes. Pt understood and gave appropriate teach back about the plan of care. All questions were answered to the patient's satisfaction. The patient is instructed to contact us at any time if questions or problems arise. Thank you for the opportunity to participate in the care of this very pleasant patient.    Total time = 80 minutes. 50% or more of this time was spent in counseling and/or " coordination of care including reviewing medical history/radiology/labs, examining patient, formulating outlined plan with team, and discussing plan with patient/family.    Zachariah Rice PA-C

## 2025-03-26 LAB
ANA PATTERN: ABNORMAL
ANA SER QL HEP2 SUBST: POSITIVE
ANA TITR SER IF: ABNORMAL {TITER}
CENTROMERE B AB SER-ACNC: <0.2 AI
CHROMATIN AB SERPL-ACNC: 1.6 AI
DSDNA AB SER-ACNC: 2 IU/ML
ENA JO1 AB SER QL IA: <0.2 AI
ENA RNP AB SER IA-ACNC: >8 AI
ENA SCL70 AB SER QL IA: <0.2 AI
ENA SM AB SER IA-ACNC: 1.8 AI
ENA SM+RNP AB SER QL IA: 6.9 AI
ENA SS-A AB SER IA-ACNC: <0.2 AI
ENA SS-B AB SER IA-ACNC: <0.2 AI
EPO SERPL-ACNC: 19 MU/ML (ref 4–27)
RIBOSOMAL P AB SER-ACNC: <0.2 AI

## 2025-03-30 LAB
ALBUMIN: 4.6 G/DL (ref 3.4–5)
ALPHA 1 GLOBULIN: 0.2 G/DL (ref 0.2–0.6)
ALPHA 2 GLOBULIN: 0.7 G/DL (ref 0.4–1.1)
BETA GLOBULIN: 0.8 G/DL (ref 0.5–1.2)
GAMMA GLOBULIN: 2.3 G/DL (ref 0.5–1.4)
IMMUNOFIXATION COMMENT: NORMAL
PATH REVIEW - SERUM IMMUNOFIXATION: NORMAL
PATH REVIEW-SERUM PROTEIN ELECTROPHORESIS: ABNORMAL
PROTEIN ELECTROPHORESIS COMMENT: ABNORMAL

## 2025-04-11 ENCOUNTER — TELEPHONE (OUTPATIENT)
Dept: HEMATOLOGY/ONCOLOGY | Facility: HOSPITAL | Age: 65
End: 2025-04-11
Payer: COMMERCIAL

## 2025-04-11 ENCOUNTER — TELEMEDICINE (OUTPATIENT)
Dept: HEMATOLOGY/ONCOLOGY | Facility: CLINIC | Age: 65
End: 2025-04-11
Payer: COMMERCIAL

## 2025-04-11 DIAGNOSIS — R76.8 ANTI-SMITH (ANTI-SM) ANTIBODY AND ANTIRIBONUCLEAR PROTEIN (ANTI-RNP) ANTIBODY POSITIVE: ICD-10-CM

## 2025-04-11 DIAGNOSIS — D63.8 ANEMIA OF CHRONIC DISEASE: Primary | ICD-10-CM

## 2025-04-11 DIAGNOSIS — R76.8 POSITIVE ANA (ANTINUCLEAR ANTIBODY): ICD-10-CM

## 2025-04-11 PROCEDURE — 1159F MED LIST DOCD IN RCRD: CPT | Performed by: PHYSICIAN ASSISTANT

## 2025-04-11 PROCEDURE — 1160F RVW MEDS BY RX/DR IN RCRD: CPT | Performed by: PHYSICIAN ASSISTANT

## 2025-04-11 PROCEDURE — 99213 OFFICE O/P EST LOW 20 MIN: CPT | Performed by: PHYSICIAN ASSISTANT

## 2025-04-11 NOTE — PROGRESS NOTES
Patient ID: Yang Carvajal is a 65 y.o. male.  Referring Physician: No referring provider defined for this encounter.  Primary Care Provider: Domo Shields MD  Visit Type: Follow Up    Location: [facility] - Main  Diagnosis/Reason: Anemia of Chronic Disease (CKD, Underlying Inflammation)    Verbal consent was requested and obtained from patient on this date for a telehealth visit.    Interval Hx:  4/11/25  Yang Carvajal is a 65 y.o. male following up today for anemia of chronic disease (CKD, underlying inflammation)    Patient denies weight loss, abnormal bruising and bleeding, hematuria, blood in stool, dark/black stools, epistaxis, oral/gingival bleeding, lymphadenopathy, recurrent infections, recurrent fevers, night sweats, early satiety, abdominal pain, bone pain, chest pain, palpitations, SOB, MITCHELL, fatigue, dizziness, lightheadedness, PICA.    Relevant Hx:  3/25/25 - Initial Visit  Yang Carvajal is a 65 y.o. male referred for consultation of anemia    Per review of records:  NC/NC anemia since at least 9/18/24 with Hb range of 12.7-12.9 since then  RBC counts & Hct's low since 9/18/24 - RDW's WNL historically    Previous Hematological Background:  Hx of hematological disorders: No - Patient denies prior hematologic history  Hx of blood transfusions: No - Patient denies prior blood transfusion  Hx of iron supplementation: Yes - Oral supplementation - Denies adverse effect and reaction - Agent: OTC iron  Hx of B12 supplementation: Yes - Prior oral supplementation - Denies adverse effect and reaction  Hx of folate supplementation: Yes - Prior oral supplementation - Denies adverse effect and reaction    Patient denies weight loss, abnormal bruising and bleeding, hematuria, blood in stool, dark/black stools, epistaxis, oral/gingival bleeding, lymphadenopathy, recurrent infections, recurrent fevers, night sweats, early satiety, abdominal pain, bone pain, chest pain, palpitations, SOB, MITCHELL, fatigue, dizziness,  lightheadedness, PICA.    PMHx:  Active Ambulatory Problems     Diagnosis Date Noted    Routine general medical examination at a health care facility 09/15/2024    Prostate cancer screening 09/15/2024    Colon cancer screening 09/15/2024    H/O hepatitis 09/15/2024    Asthenospermia 11/01/2024    BPH with obstruction/lower urinary tract symptoms 11/01/2024    Male infertility 11/01/2024    Orchitis and epididymitis 07/08/2005    URI (upper respiratory infection) 11/01/2024    Microcytosis 11/10/2024    Primary hypertension 11/10/2024    Left cervical radiculopathy 12/29/2024     Resolved Ambulatory Problems     Diagnosis Date Noted    Elevated blood pressure reading 11/01/2024    Hepatitis C virus infection 01/06/2011     Past Medical History:   Diagnosis Date    Hepatitis      PSHx:  Past Surgical History:   Procedure Laterality Date    APPENDECTOMY      OTHER SURGICAL HISTORY  11/13/2018    Appendectomy     FHx:  Family History   Problem Relation Name Age of Onset    Hypertension Mother       Siblings: 1 sister w/ iron deficiency  Children: 1 - Denies significant medical hx    Social Hx:  Yang Carvajal    reports that he has never smoked. He has never used smokeless tobacco.  He  reports current alcohol use.  He  reports no history of drug use.  Social History     Socioeconomic History    Marital status: Unknown   Tobacco Use    Smoking status: Never    Smokeless tobacco: Never   Substance and Sexual Activity    Alcohol use: Yes    Drug use: Never      Living Situation: Lives at home w/ family  Occupation: Teacher  Marital Status:   Alcohol Use: Denies  Smoking: Never smoker  Recreational Drug Use: Denies  Special Diets: Avoids red meats    Cancer Screenings:  Upper EGD: No record EMR  Colonoscopy: No record EMR   Prostate/PSA screenings: 9/18/24  Lung cancer screenings: No record EMR    Medications and allergies reviewed in EMR.    ROS:  Review of Systems - Oncology   10 point review of systems negative  "except as state in HPI.    Vitals & Statistics:  Objective   BSA: There is no height or weight on file to calculate BSA.  There were no vitals taken for this visit.    Physical Exam:  Physical Exam  N/A - Virtual visit    Results:  Lab Results   Component Value Date    WBC 5.1 03/25/2025    NEUTROABS 2.51 03/25/2025    IGABSOL 0.01 03/25/2025    LYMPHSABS 1.78 03/25/2025    MONOSABS 0.44 03/25/2025    EOSABS 0.27 03/25/2025    BASOSABS 0.05 03/25/2025    RBC 4.56 03/25/2025    MCV 89 03/25/2025    MCHC 32.5 03/25/2025    HGB 13.2 (L) 03/25/2025    HCT 40.6 (L) 03/25/2025     03/25/2025     Lab Results   Component Value Date    RETICCTPCT 1.6 03/25/2025      Lab Results   Component Value Date    CREATININE 1.30 03/25/2025    BUN 19 03/25/2025    EGFR 61 03/25/2025     03/25/2025    K 4.2 03/25/2025     03/25/2025    CO2 31 03/25/2025      Lab Results   Component Value Date    ALT 18 03/25/2025    AST 19 03/25/2025    ALKPHOS 64 03/25/2025    BILITOT 0.3 03/25/2025      Lab Results   Component Value Date    TSH 1.01 03/25/2025     Lab Results   Component Value Date    TSH 1.01 03/25/2025     Lab Results   Component Value Date    IRON 72 03/25/2025    TIBC 390 03/25/2025    FERRITIN 245 03/25/2025      Lab Results   Component Value Date    UFQOCHQQ11 304 03/25/2025      Lab Results   Component Value Date    FOLATE 9.8 03/25/2025     Lab Results   Component Value Date    JAMES Positive (A) 03/25/2025    RF <10 03/25/2025    SEDRATE 4 03/25/2025      Lab Results   Component Value Date    CRP 0.13 03/25/2025      No results found for: \"KERA\"  Lab Results   Component Value Date     03/25/2025     Lab Results   Component Value Date    HAPTOGLOBIN 94 03/25/2025     Lab Results   Component Value Date    SPEP Increase in polyclonal gamma globulins.    03/25/2025     Lab Results   Component Value Date    IGG 2,640 (H) 03/25/2025    IGM 99 03/25/2025     03/25/2025     No results found for: " "\"HEPATOT\", \"HEPAIGM\", \"HEPBCIGM\", \"HEPBCAB\", \"HEPBSAG\", \"HEPCAB\"  Lab Results   Component Value Date    HIV1X2 Nonreactive 09/18/2024       Assessment:  Yang Carvajal is a 65 y.o. male following up today for anemia of chronic disease (CKD, underlying inflammation)    I reviewed patient's chart including but not limited to labs, imaging, surgical/procedure notes, pathology, hospital notes, doctor's notes.    3/25/25 results:  WBC count & differential WNL  NC/NC anemia w/ Hb improved to 13.2 - RBC count WNL, Hct low, RDW WNL  Platelets WNL  Renal: BUN WNL - Creatinine WNL (1.30) - GFR low at 61 - Indicative of CKD as GFR low at 64 on 9/18/24  Iron studies: Ferritin elevated for this practice 245 - Iron and TIBC WNL - %Sat low at 18%  JAMES: Positive w/ 1:320 speckled titer - +anti-SM antibodies, anti-RNP antibodies, anti-chromatin antibodies  B12: Low-normal at 304  SPEP: Increased polyclonal gamma globulins  Igg's: IgG elevated at 2640, IgM and IgA WNL  FLC's: Kappa and lambda elevated w/ normal ratio - Most likely 2/2 underlying inflammation vs CKD as SPEP w/o paraprotein    Plan:  4/11/25 - Hematologic workup revealed NC/NC anemia w/ Hb improved to 13.2 as well as evidence of underlying CKD, underlying inflammation and possible autoimmune condition. There was no evidence of nutrient deficiency, hemolysis, hepatic disease, thyroid disease, paraprotein. Patient did have +JAMES w/ positive anti-SM, anti-RNP and anti-chromatin antibodies which is evidence of autoimmune condition. Therefore, patient's anemia is favored to be anemia of chronic disease (underlying CKD and inflammation). However, because patient's hemoglobin has improved and remains > 10 g/dL there is no need for hematologic intervention at this time.    Anemia of Chronic Disease (CKD, Underlying Inflammation)  Referral to rheumatology for +ANA, +anti-SM, +anti-RNP and +anti-chromatin ab's  Does not need to continue to follow with me at this time but am more " than happy to see the patient in the future if needed.    I had an extensive discussion with the patient regarding the diagnosis and discussed the plan of therapy, including general considerations regarding side effects and outcomes. Pt understood and gave appropriate teach back about the plan of care. All questions were answered to the patient's satisfaction. The patient is instructed to contact us at any time if questions or problems arise. Thank you for the opportunity to participate in the care of this very pleasant patient.    Total time = 80 minutes. 50% or more of this time was spent in counseling and/or coordination of care including reviewing medical history/radiology/labs, examining patient, formulating outlined plan with team, and discussing plan with patient/family.    Zachariah Rice PA-C

## 2025-04-11 NOTE — TELEPHONE ENCOUNTER
patient is saying he's having a hard time trying to find the link for his virtual with you. are you able to call him?

## 2025-07-10 ENCOUNTER — OFFICE VISIT (OUTPATIENT)
Dept: PRIMARY CARE | Facility: CLINIC | Age: 65
End: 2025-07-10
Payer: COMMERCIAL

## 2025-07-10 VITALS
HEIGHT: 70 IN | WEIGHT: 199 LBS | BODY MASS INDEX: 28.49 KG/M2 | DIASTOLIC BLOOD PRESSURE: 84 MMHG | SYSTOLIC BLOOD PRESSURE: 151 MMHG | HEART RATE: 83 BPM

## 2025-07-10 DIAGNOSIS — R30.0 DYSURIA: ICD-10-CM

## 2025-07-10 DIAGNOSIS — Z00.00 ROUTINE GENERAL MEDICAL EXAMINATION AT A HEALTH CARE FACILITY: ICD-10-CM

## 2025-07-10 DIAGNOSIS — I10 PRIMARY HYPERTENSION: Primary | ICD-10-CM

## 2025-07-10 DIAGNOSIS — N30.00 ACUTE CYSTITIS WITHOUT HEMATURIA: ICD-10-CM

## 2025-07-10 LAB
POC APPEARANCE, URINE: CLEAR
POC BILIRUBIN, URINE: ABNORMAL
POC BLOOD, URINE: ABNORMAL
POC COLOR, URINE: ABNORMAL
POC GLUCOSE, URINE: NEGATIVE MG/DL
POC KETONES, URINE: ABNORMAL MG/DL
POC LEUKOCYTES, URINE: ABNORMAL
POC NITRITE,URINE: POSITIVE
POC PH, URINE: 6 PH
POC PROTEIN, URINE: ABNORMAL MG/DL
POC SPECIFIC GRAVITY, URINE: 1.02
POC UROBILINOGEN, URINE: 0.2 EU/DL

## 2025-07-10 RX ORDER — CIPROFLOXACIN 500 MG/1
500 TABLET, FILM COATED ORAL 2 TIMES DAILY
Qty: 14 TABLET | Refills: 0 | Status: SHIPPED | OUTPATIENT
Start: 2025-07-10 | End: 2025-07-17

## 2025-07-10 ASSESSMENT — PATIENT HEALTH QUESTIONNAIRE - PHQ9
1. LITTLE INTEREST OR PLEASURE IN DOING THINGS: NOT AT ALL
2. FEELING DOWN, DEPRESSED OR HOPELESS: NOT AT ALL
SUM OF ALL RESPONSES TO PHQ9 QUESTIONS 1 AND 2: 0

## 2025-07-10 ASSESSMENT — ENCOUNTER SYMPTOMS
OCCASIONAL FEELINGS OF UNSTEADINESS: 0
DEPRESSION: 0
LOSS OF SENSATION IN FEET: 0

## 2025-07-12 PROBLEM — N30.00 ACUTE CYSTITIS WITHOUT HEMATURIA: Status: ACTIVE | Noted: 2025-07-12

## 2025-07-12 PROBLEM — R30.0 DYSURIA: Status: ACTIVE | Noted: 2025-07-12

## 2025-07-12 LAB — BACTERIA UR CULT: ABNORMAL

## 2025-07-12 NOTE — PROGRESS NOTES
Assessment and Plan:  Problem List Items Addressed This Visit       Routine general medical examination at a health care facility    Relevant Orders    POCT UA (nonautomated) manually resulted    Primary hypertension - Primary    Current Assessment & Plan   What is High Blood Pressure? High blood pressure (also called hypertension) is when your blood moves through your arteries at a higher pressure than normal and that forces your heart to work harder to pump the blood.     What are the Complications of High Blood Pressure? When your blood pressure gets too high or stays high for a long time, it can cause health problems such as:    Kidney disease or kidney failure   Heart attack and heart failure   Stroke   Vision problems   Circulation problems, poor blood supply to the legs    How are the causes of High Blood Pressure? There are many different causes and your provider can help you find out what might be causing your pressure to be too high.     What are the Signs of High Blood Pressure? High blood pressure doesn’t usually have warning signs or symptoms, so many people don’t realize they have it and that is why regular monitoring is important.     Prevention and Treatment: Often high blood pressure can be controlled with lifestyle changes and when necessary, medications. Adopting heart healthy habits can help:      Maintain a Healthy Weight   Eat a heart healthy diet full of vegetables, fruits and whole grains that are high in fiber   Be Physically Active every day   Find heart healthy ways to reduce stress and increase relaxation    Don’t use tobacco products   Limit your intake of alcohol, caffeine and salt   Monitor your blood pressure regularly: ask your provider how often   Take your medications as prescribed, even when you’re feeling well   Patient was advised that it is very important that he start medication will start amlodipine 5 mg daily patient will continue to monitor blood pressure at home and call  "this office back he was advised to stop in 2 weeks for nurse visit to get blood pressure checked follow-up with me in a month to check blood pressure         Acute cystitis without hematuria    Relevant Medications    ciprofloxacin (Cipro) 500 mg tablet    Other Relevant Orders    Urine Culture (Completed)    Dysuria    Relevant Orders    POCT UA (nonautomated) manually resulted (Completed)         HPI:  Patient is here for follow-up of hypertension hyperlipidemia has been taking meds as prescribed denies chest pain shortness of breath current allergies myalgias dizziness been eating healthy and trying to increase exercise  Dysuria for 2 days  Fever for 2 days with chills.       ROS   Constitutional:  o weight loss. Negative for chills and fever.   HENT: Negative.     Respiratory: Negative.     Cardiovascular: Negative.    Gastrointestinal: Negative.  Negative for nausea and vomiting.   Endocrine: Negative.    Genitourinary: Dyuria  Musculoskeletal: Negative.    Skin: Negative.  Negative for rash.   Allergic/Immunologic: Negative.    Neurological: Negative.    Hematological: Negative.    Psychiatric/Behavioral: Negative.     All other systems reviewed and are negative.      /84   Pulse 83   Ht 1.778 m (5' 10\")   Wt 90.3 kg (199 lb)   BMI 28.55 kg/m²   Body mass index is 28.55 kg/m².  Physical Exam    ENT:      Head: Normocephalic and atraumatic.      Right Ear: Tympanic membrane normal.      Left Ear: Tympanic membrane normal.      Nose: Nose normal.      Mouth/Throat:      Mouth: Mucous membranes are moist.   Eyes:      Pupils: Pupils are equal, round, and reactive to light.   Cardiovascular:      Rate and Rhythm: Normal rate and regular rhythm.      Pulses: Normal pulses.      Heart sounds: Normal heart sounds.   Pulmonary:      Effort: Pulmonary effort is normal.      Breath sounds: Normal breath sounds.   Abdominal:      General: Abdomen is flat. Bowel sounds are normal.      Palpations: Abdomen is " soft.   Musculoskeletal:         General: Normal range of motion.      Cervical back: Normal range of motion and neck supple.   Skin:     General: Skin is warm and dry.      Capillary Refill: Capillary refill takes less than 2 seconds.   Neurological:      General: No focal deficit present.      Mental Status: She is alert and oriented to person, place, and time.   Psychiatric:         Mood and Affect: Mood normal.       Active Problem List  Problem List[1]    Comprehensive Medical/Surgical/Social/Family History  Medical History[2]  Surgical History[3]  Social History     Social History Narrative    Not on file       Allergies and Medications  Sulfa (sulfonamide antibiotics)  Current Outpatient Medications   Medication Instructions    amLODIPine (NORVASC) 5 mg, oral, Daily    ciprofloxacin (CIPRO) 500 mg, oral, 2 times daily    tadalafil (Cialis) 5 mg tablet 1 tablet, Daily          [1]   Patient Active Problem List  Diagnosis    Routine general medical examination at a health care facility    Prostate cancer screening    Colon cancer screening    H/O hepatitis    Asthenospermia    BPH with obstruction/lower urinary tract symptoms    Male infertility    Orchitis and epididymitis    URI (upper respiratory infection)    Microcytosis    Primary hypertension    Left cervical radiculopathy    Positive JAMES (antinuclear antibody)    Anemia of chronic disease    Acute cystitis without hematuria    Dysuria   [2]   Past Medical History:  Diagnosis Date    Elevated blood pressure reading 11/01/2024    Hepatitis    [3]   Past Surgical History:  Procedure Laterality Date    APPENDECTOMY      OTHER SURGICAL HISTORY  11/13/2018    Appendectomy

## 2025-07-22 ENCOUNTER — APPOINTMENT (OUTPATIENT)
Dept: RHEUMATOLOGY | Facility: CLINIC | Age: 65
End: 2025-07-22
Payer: COMMERCIAL

## 2025-07-22 VITALS
BODY MASS INDEX: 28.96 KG/M2 | DIASTOLIC BLOOD PRESSURE: 101 MMHG | TEMPERATURE: 97.9 F | HEART RATE: 65 BPM | SYSTOLIC BLOOD PRESSURE: 173 MMHG | HEIGHT: 70 IN | OXYGEN SATURATION: 97 % | WEIGHT: 202.3 LBS

## 2025-07-22 DIAGNOSIS — R76.8 ANA POSITIVE: Primary | ICD-10-CM

## 2025-07-22 PROCEDURE — 3080F DIAST BP >= 90 MM HG: CPT | Performed by: INTERNAL MEDICINE

## 2025-07-22 PROCEDURE — 3077F SYST BP >= 140 MM HG: CPT | Performed by: INTERNAL MEDICINE

## 2025-07-22 PROCEDURE — 99204 OFFICE O/P NEW MOD 45 MIN: CPT | Performed by: INTERNAL MEDICINE

## 2025-07-22 PROCEDURE — 1036F TOBACCO NON-USER: CPT | Performed by: INTERNAL MEDICINE

## 2025-07-22 PROCEDURE — 3008F BODY MASS INDEX DOCD: CPT | Performed by: INTERNAL MEDICINE

## 2025-07-22 PROCEDURE — 1126F AMNT PAIN NOTED NONE PRSNT: CPT | Performed by: INTERNAL MEDICINE

## 2025-07-22 PROCEDURE — 1159F MED LIST DOCD IN RCRD: CPT | Performed by: INTERNAL MEDICINE

## 2025-07-22 ASSESSMENT — PAIN SCALES - GENERAL: PAINLEVEL_OUTOF10: 0-NO PAIN

## 2025-07-22 NOTE — PROGRESS NOTES
Informants: Patient and EMR.  PP: 65-year-old male with history of hypertension, positive JMAES, recent Klebsiella pneumonia urinary tract infection.  CC: Referred for evaluation of abnormal laboratory findings with positive JAMES SM RNP SM/RNP, antichromatin.  Andreafski: He presents for evaluation of significant musculoskeletal complaints.  He has not noted any mucosal ulcerations, Raynaud's phenomenon rashes, joint pain or joint swelling, morning stiffness, uveitis symptoms, photosensitivity, chest pain, shortness of breath, or prior blood clots.  He recently took iron supplement for anemia.  He has a remote history of epididymitis/orchitis, prostatic hypertrophy.  He had laboratory evaluation presenting with left arm numbness.  He had MRI scan of the cervical spine that demonstrated some cervical spondylosis.  Laboratory testing 3/25/2025 that demonstrated a positive JAMES and positive JAMES panel.  PH: Allergies: Sulfa; illnesses: Hypertension, remote history of hepatitis, BPH, positive JAMES, epididymitis/orchitis, Klebsiella pneumonia urinary tract infection; surgeries: Appendectomy.  SH: He is employed as a teacher and denies any tobacco alcohol or illicit drug use.  FH: Father  from complications of a medical procedure.  His mother brought him to sisters and son are healthy.  He has no daughters.  There is no known family history of lupus, rheumatoid arthritis.  PX: He is a well-developed, well-nourished black male.  The sclera are not injected.  The neck is supple.  The lungs are clear to auscultation.  The heart is regular rhythm with normal heart sounds.  The abdomen is benign.  Extremities are without cyanosis clubbing or edema.  The musculoskeletal examination does not show any synovitis.  The neurological examination shows normal muscle strength.  Laboratory (7/10/2025) urine culture: Klebsiella pneumonia >100,000, urinalysis: Trace protein, 3+ bilirubin, 2+ ketones, 1+ bilirubin, (3/25/2025) JAMES 1: 320, SM  1.8, RNP >8.0, SM/RNP 6.9, chromatin 1.6, vitamin B12 304, TSH 1.01, serum protein electrophoresis: Polyclonal gammopathy, CRP 0.13, ESR 4, RF <10, reticulocyte count 1.6%, ferritin 245, iron 72, TIBC 390, saturation 18%, IgG 2640, IgA 180, IgM 99, folate 9.8, KERA negative, BUN 19, creatinine 1.3, glucose 112, WBC 5.1, hemoglobin 13.2, hematocrit 40.6, MCV 89, MCHC 32.5, platelets 206, calcium 10.0, albumin 4.8, alkaline phosphatase 64, AST 19, ALT 18.  MRI cervical spine (1/29/2025) remote appearing anterior wedge C5 vertebral body.  Multilevel degenerative change.  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  There is minimal disc bulge at C2/C3 and see 7/T1 and mild disc bulge at C3/C4.  Disc bulge with mild central spinal canal stenosis at C4/C5 and C5/C6.  Impression: 65-year-old black male of [place] heritage with history of hypertension, positive JAMES, positive anti-SSA, positive anti-RNP, positive anti-SM/RNP, positive antichromatin, recent Klebsiella pneumonia urinary tract infection, history of hepatitis, BPH, epididymitis/orchitis, cervical spondylosis presenting for evaluation of positive autoimmune laboratory testing without associated symptoms.  Plan: No indication for any medication for autoimmune disease at this time due to absence of any symptoms or findings.  He is to have repeat laboratory for JAMES panel, CRP, C3, C4, HLA-B27, and G6PD screen.  He is to return in 6 months for evaluation.

## 2025-07-24 LAB
ANA PAT SER IF-IMP: ABNORMAL
ANA SER QL IF: POSITIVE
ANA TITR SER IF: ABNORMAL TITER
APPEARANCE UR: CLEAR
BILIRUB UR QL STRIP: NEGATIVE
C3 SERPL-MCNC: 139 MG/DL (ref 82–185)
C4 SERPL-MCNC: 17 MG/DL (ref 15–53)
COLOR UR: YELLOW
CREAT UR-MCNC: 43 MG/DL (ref 20–320)
DSDNA AB SER-ACNC: 2 IU/ML
ENA RNP AB SER-ACNC: ABNORMAL AI
ENA SM AB SER IA-ACNC: ABNORMAL AI
ENA SM+RNP AB SER IA-ACNC: ABNORMAL AI
G6PD RBC-CCNT: NORMAL
GLUCOSE UR QL STRIP: NEGATIVE
HGB UR QL STRIP: NEGATIVE
HLA-B27 QL NAA+PROBE: NORMAL
KETONES UR QL STRIP: NEGATIVE
LABORATORY COMMENT REPORT: ABNORMAL
LEUKOCYTE ESTERASE UR QL STRIP: NEGATIVE
NITRITE UR QL STRIP: NEGATIVE
NUCLEOSOME AB SER IA-ACNC: ABNORMAL AI
PH UR STRIP: 5.5 [PH] (ref 5–8)
PROT UR QL STRIP: NEGATIVE
PROT UR-MCNC: <4 MG/DL (ref 5–25)
PROT/CREAT UR: ABNORMAL MG/G CREAT (ref 25–148)
PROT/CREAT UR: ABNORMAL MG/MG CREAT (ref 0.03–0.15)
QUEST HLAB27 TYPING RESULTS REVIEWED BY:: NORMAL
SP GR UR STRIP: 1 (ref 1–1.03)

## 2025-07-29 LAB
ANA PAT SER IF-IMP: ABNORMAL
ANA SER QL IF: POSITIVE
ANA TITR SER IF: ABNORMAL TITER
APPEARANCE UR: CLEAR
BILIRUB UR QL STRIP: NEGATIVE
C3 SERPL-MCNC: 139 MG/DL (ref 82–185)
C4 SERPL-MCNC: 17 MG/DL (ref 15–53)
COLOR UR: YELLOW
CREAT UR-MCNC: 43 MG/DL (ref 20–320)
DSDNA AB SER-ACNC: 2 IU/ML
ENA RNP AB SER-ACNC: ABNORMAL AI
ENA SM AB SER IA-ACNC: ABNORMAL AI
ENA SM+RNP AB SER IA-ACNC: ABNORMAL AI
G6PD RBC-CCNT: 2.4 U/G HGB (ref 7–20.5)
GLUCOSE UR QL STRIP: NEGATIVE
HGB UR QL STRIP: NEGATIVE
HLA-B27 QL NAA+PROBE: NEGATIVE
KETONES UR QL STRIP: NEGATIVE
LABORATORY COMMENT REPORT: ABNORMAL
LEUKOCYTE ESTERASE UR QL STRIP: NEGATIVE
NITRITE UR QL STRIP: NEGATIVE
NUCLEOSOME AB SER IA-ACNC: ABNORMAL AI
PH UR STRIP: 5.5 [PH] (ref 5–8)
PROT UR QL STRIP: NEGATIVE
PROT UR-MCNC: <4 MG/DL (ref 5–25)
PROT/CREAT UR: ABNORMAL MG/G CREAT (ref 25–148)
PROT/CREAT UR: ABNORMAL MG/MG CREAT (ref 0.03–0.15)
QUEST HLAB27 TYPING RESULTS REVIEWED BY:: NORMAL
SP GR UR STRIP: 1 (ref 1–1.03)